# Patient Record
Sex: FEMALE
[De-identification: names, ages, dates, MRNs, and addresses within clinical notes are randomized per-mention and may not be internally consistent; named-entity substitution may affect disease eponyms.]

---

## 2019-06-07 ENCOUNTER — HOSPITAL ENCOUNTER (EMERGENCY)
Dept: HOSPITAL 75 - ER FS | Age: 49
Discharge: HOME | End: 2019-06-07
Payer: COMMERCIAL

## 2019-06-07 VITALS — HEIGHT: 67 IN | WEIGHT: 175 LBS | BODY MASS INDEX: 27.47 KG/M2

## 2019-06-07 VITALS — DIASTOLIC BLOOD PRESSURE: 64 MMHG | SYSTOLIC BLOOD PRESSURE: 110 MMHG
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
## 2019-06-07 DIAGNOSIS — S60.221A: Primary | ICD-10-CM

## 2019-06-07 DIAGNOSIS — Z88.5: ICD-10-CM
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
## 2019-06-07 PROCEDURE — 73130 X-RAY EXAM OF HAND: CPT

## 2019-06-07 NOTE — ED UPPER EXTREMITY
General


Chief Complaint:  Upper Extremity


Stated Complaint:  WC LT HAND INJ


Source:  patient


Exam Limitations:  no limitations





History of Present Illness


Date Seen by Provider:  2019


Time Seen by Provider:  10:00


Onset:  yesterday


Severity:  moderate


Pain/Injury Location:  left hand


Method of Injury:  direct blow


Modifying Factors:  Improves With Cold Therapy, Improves With Movement, Improves

With Rest


The patient is a very pleasant 48-year-old female who presents for evaluation of

the left hand injury. She states that she was at work yesterday and was removing

something from a metal shelf. During this process she smacked the back of her 

hand against the shelf with great force. She initially had a small amount of 

swelling but continued to work. Over night and into this morning the pain and 

swelling has significantly intensified. Upon arrival her entire left hand is 

very swollen and there is some bruising. She has significant pain when moving 

her left hand. She denies any wrist or forearm or elbow discomfort. Keeping the 

hand still does help relieve the pain slightly. She is not taking any 

medications.





Allergies and Home Medications


Allergies


Coded Allergies:  


     hydrocodone (Verified  Allergy, Unknown, Rash, 19)





Patient Home Medication List


Home Medication List Reviewed:  Yes





Review of Systems


Constitutional:  no symptoms reported


EENTM:  no symptoms reported


Respiratory:  no symptoms reported


Cardiovascular:  no symptoms reported


Gastrointestinal:  no symptoms reported


Genitourinary:  no symptoms reported


Musculoskeletal:  joint pain (left hand)


Skin:  no symptoms reported


Psychiatric/Neurological:  No Symptoms Reported





All Other Systems Reviewed


Negative Unless Noted:  Yes





Past Medical-Social-Family Hx


Past Med/Social Hx:  Reviewed Nursing Past Med/Soc Hx, Reviewed and Corrections 

made





Physical Exam


Vital Signs





Vital Signs - First Documented








 19





 09:50


 


Temp 97.9


 


Pulse 76


 


Resp 18


 


B/P (MAP) 110/64 (79)


 


Pulse Ox 99


 


O2 Delivery Room Air





Capillary Refill :


Height, Weight, BMI


Height: '"


Weight: lbs. oz. kg;  BMI


Method:


General Appearance:  WD/WN


HEENT:  PERRL/EOMI, normal ENT inspection


Neck:  non-tender, full range of motion, supple


Cardiovascular:  regular rate, rhythm, no edema, no JVD


Respiratory:  chest non-tender, lungs clear, normal breath sounds


Wrist:  Yes normal inspection, Yes non-tender, Yes no evidence of injury, Yes 

normal ROM


Hand:  Left, bone tenderness (there is some bone tenderness over the third and 

fourth metacarpals of the left hand, there is moderate to severe swelling of the

entire left hand and some mild ecchymosis over the distal metacarpals on the 

dorsal aspect), ecchymosis, limited ROM, swelling


Neurologic/Tendon:  normal sensation, normal motor functions, normal tendon 

functions, responds to pain


Neurologic/Psychiatric:  CNs II-XII nml as tested, alert, normal mood/affect, 

oriented x 3


Skin:  normal color, warm/dry





Progress/Results/Core Measures


Results/Orders


My Orders





Orders - ROHIT VELÁSQUEZ DO


Hand 3 View Left (19 10:03)


Ice: Apply To Affected Area (19 10:03)





Vital Signs/I&O











 19





 09:50


 


Temp 97.9


 


Pulse 76


 


Resp 18


 


B/P (MAP) 110/64 (79)


 


Pulse Ox 99


 


O2 Delivery Room Air











Diagnostic Imaging





   Diagonstic Imaging:  Xray


   Plain Films/CT/US/NM/MRI:  hand


Comments


                 [facility] [place], [place]





NAME:   RENE BAUTISTA


MED REC#:   [id number]


ACCOUNT#:   [id number]


PT STATUS:   REG ER


:   1970


PHYSICIAN:   ROHIT VELÁSQUEZ DO


ADMIT DATE:   19/ER FS


                                   ***Draft***


Date of Exam:19





HAND 3 VIEW LEFT








INDICATION: Injury to left hand at work.





Time of exam: 9:49 AM





3 views of the left hand were obtained. Distal radius and ulna


appear intact. The carpus appears intact. Metacarpals and


phalanges are unremarkable. No fractures are seen.





IMPRESSION: No acute bony abnormality is identified.





  Dictated on workstation # [id number]








Dict:   [id number] 1017


Trans:   19 1023


JENNIFER [id number]





Interpreted by:     NEEMA LOJA MD


Electronically signed by:





Departure


Impression





   Primary Impression:  


   Contusion of hand


Disposition:  01 HOME, SELF-CARE


Condition:  Stable





Departure-Patient Inst.


Referrals:  


NO,LOCAL PHYSICIAN (PCP/Family)


Primary Care Physician


Patient Instructions:  Contusion (DC)


Scripts


Tramadol HCl (Tramadol HCl) 50 Mg Tablet


50 MG PO Q6H PRN for PAIN for 3 Days, #12 TAB 0 Refills


   Prov: ROHIT VELÁSQUEZ DO         19











ROHIT VELÁSQUEZ DO               2019 10:10

## 2019-06-07 NOTE — DIAGNOSTIC IMAGING REPORT
INDICATION: Injury to left hand at work.



Time of exam: 9:49 AM



3 views of the left hand were obtained. Distal radius and ulna

appear intact. The carpus appears intact. Metacarpals and

phalanges are unremarkable. No fractures are seen.



IMPRESSION: No acute bony abnormality is identified.



Dictated by: 



  Dictated on workstation # [id number]

## 2023-06-15 ENCOUNTER — HOSPITAL ENCOUNTER (OUTPATIENT)
Dept: HOSPITAL 75 - PREOP | Age: 53
LOS: 1 days | Discharge: HOME | End: 2023-06-16
Attending: SURGERY
Payer: COMMERCIAL

## 2023-06-15 VITALS — BODY MASS INDEX: 30.61 KG/M2 | WEIGHT: 201.94 LBS | HEIGHT: 67.99 IN

## 2023-06-15 DIAGNOSIS — Z01.818: Primary | ICD-10-CM

## 2023-06-26 ENCOUNTER — HOSPITAL ENCOUNTER (OUTPATIENT)
Dept: HOSPITAL 75 - ENDO | Age: 53
Discharge: HOME | End: 2023-06-26
Attending: SURGERY
Payer: COMMERCIAL

## 2023-06-26 VITALS — SYSTOLIC BLOOD PRESSURE: 127 MMHG | DIASTOLIC BLOOD PRESSURE: 54 MMHG

## 2023-06-26 VITALS — WEIGHT: 201.94 LBS | HEIGHT: 67.99 IN | BODY MASS INDEX: 30.61 KG/M2

## 2023-06-26 VITALS — SYSTOLIC BLOOD PRESSURE: 102 MMHG | DIASTOLIC BLOOD PRESSURE: 73 MMHG

## 2023-06-26 VITALS — DIASTOLIC BLOOD PRESSURE: 56 MMHG | SYSTOLIC BLOOD PRESSURE: 127 MMHG

## 2023-06-26 VITALS — DIASTOLIC BLOOD PRESSURE: 73 MMHG | SYSTOLIC BLOOD PRESSURE: 102 MMHG

## 2023-06-26 DIAGNOSIS — K63.5: ICD-10-CM

## 2023-06-26 DIAGNOSIS — K52.9: ICD-10-CM

## 2023-06-26 DIAGNOSIS — D12.4: ICD-10-CM

## 2023-06-26 DIAGNOSIS — Z12.11: Primary | ICD-10-CM

## 2023-06-26 DIAGNOSIS — Z28.310: ICD-10-CM

## 2023-06-26 DIAGNOSIS — K62.1: ICD-10-CM

## 2023-06-26 DIAGNOSIS — K63.89: ICD-10-CM

## 2023-06-26 DIAGNOSIS — K31.89: ICD-10-CM

## 2023-06-26 DIAGNOSIS — K21.00: ICD-10-CM

## 2023-06-26 DIAGNOSIS — K44.9: ICD-10-CM

## 2023-06-26 DIAGNOSIS — K64.8: ICD-10-CM

## 2023-06-26 DIAGNOSIS — F17.210: ICD-10-CM

## 2023-06-26 DIAGNOSIS — K29.50: ICD-10-CM

## 2023-06-26 NOTE — ENDOSCOPY DISCHARGE INSTRUCT
Endo Procedure/Findings


Findings


1.:  Gastritis


2.:  Hiatal Hernia


3.:  Polyp


4.:  Colitis, Internal Hemorrhoids





Discharge Instructions


-


Activity: You might feel a little sleepy until tomorrow.  This is due to the 

medicine you received to relax you.





Until tomorrow, you should:  


   NOT drive a car, operate machinery or power tools.


   NOT drink any alcoholic beverages.


   NOT make any important decisions or sign importortant papers.





Do not return to work until tomorrow, unless otherwise instructed. Resume 

previous activities tomorrow.





Diet: Start by taking liquids.  If you tolerate liquids, advance to solid food.


1.:  EGD in 3 years


2.:  Colonoscopy in 1 year, Colonscopy in 3 years





Notify Physician


-


If you experience excessive bleeding, unusual abdominal pain, fever, or chest p

ain, contact your doctor immediately.





Follow-Up:


Other Follow up


in my office in one week











PONCHO LOMELI DO               Jun 26, 2023 10:26

## 2023-06-26 NOTE — PROGRESS NOTE-POST OPERATIVE
Post-Operative Progess Note


Surgeon (s)/Assistant (s)


Surgeon


PONCHO LOMELI DO


Assistant:  TIMO Bordne





Pre-Operative Diagnosis


GERD, Hx of Polyps





Post-Operative Diagnosis





Gastritis


Esophagitis


Small sliding Hiatal hernia


Polyps


Colitis of cecum


int hemorrhoids





Procedure & Operative Findings


Date of Procedure


6/26/23


Procedure Performed/Findings


EGD with biopsy


Colonoscopy with snare polypectomy


Colonoscopy with cold bx





PROCEDURE NOTE:


After informed consent was obtained, the patient was brought to the endoscopy


suite, placed in bed in left lateral decubitus position.  She was administered


IV sedation by the CRNA who then monitored  vitals the entire time, heart


rate, blood pressure and pulse ox and the scope was inserted down the mouth


through the esophagus into the stomach.  On the way down, noted some mild


esophagitis, took a picture, pushed into the stomach, pushed past the antrum


into the duodenum.  Duodenum looked good.  Pulled back and did a biopsy of


antrum, then retroflexed the scope, saw very small sliding hiatal hernia, took a




picture of this and then pulled the scope into the GE junction and then did a 

biopsy 


of the GE junction.  Pushed the scope back into the stomach, suctioned all the 

air 


out of the stomach. At this point pulled the scope up the esophagus and out the 


mouth.  Switched camera, switched gloves, went down below and started the 


colonoscopy.  Pushed all the way to about 150 cm and pushed into the cecum.  


However, on the way in found a polyp in the Sigmoid and then Descending colon


that I elected to remove with hot snare.  I also found poor prep and I took a 

picture.


Once in the cecum I took a picture of appendiceal orifice and noted the 

ileocecal 


valve. I also found what looked like colitis and elected to do three random 

biopsies


in the Cecum.  Then slowly withdrew the scope insufflating to look 

circumferentially 


at the walls starting in the cecum and up the ascending colon.  I found another 

polyp


here and removed it with the snare.  Continued to the hepatic flexure, then down

the 


transverse colon where I found a flat polyp that I removed with the biopsy 

forceps.


Next, to the splenic flexure, into the descending colon down in the sigmoid and 

then 


into the rectum.  I found another flat polyp here that I also removed with cold 

biopsy.


Finally, in the rectal vault I retroflexed the scope and took a picture of the 

internal 


hemorrhoids.  





The patient tolerated the procedure and she recovered in the endoscopy suite.





Recommended for repeat colonoscopy in 1-3 years


Anesthesia Type


IV sedation by CRNA





Estimated Blood Loss


Estimated blood loss (mL):  scant





Specimens/Packing


Specimens Removed


antral bx


GE jxn bx


sigmoid polyp


descending polyp


cecal biopsies


asc colon poly


transverse polyp


rectal polyp











PONCHO LOMELI DO               Jun 26, 2023 10:23

## 2023-06-26 NOTE — PROGRESS NOTE-PRE OPERATIVE
Pre-Operative Progress Note


Date of Available H&P:  Jun 13, 2023


Date H&P Reviewed:  Jun 26, 2023


Time H&P Reviewed:  08:18


History & Physical:  H&P Reviewed, Patient Examed, No changes noted


Pre-Operative Diagnosis:  GERD, Hx of Polyps











PONCHO LOMELI DO               Jun 26, 2023 08:20

## 2023-06-26 NOTE — ANESTHESIA-GENERAL POST-OP
MAC


Patient Condition


Mental Status/LOC:  Same as Preop


Cardiovascular:  Satisfactory


Nausea/Vomiting:  Absent


Respiratory:  Satisfactory


Pain:  Controlled


Complications:  Absent





Post Op Complications


Complications


None





Follow Up Care/Instructions


Patient Instructions


None needed.





Anesthesiology Discharge Order


Discharge Order


Patient is doing well, no complaints, stable vital signs, no apparent adverse 

anesthesia problems.   


No complications reported per nursing.











ADALBERTO LARKIN CRNA         Jun 26, 2023 10:20